# Patient Record
Sex: FEMALE | Race: WHITE | Employment: OTHER | URBAN - METROPOLITAN AREA
[De-identification: names, ages, dates, MRNs, and addresses within clinical notes are randomized per-mention and may not be internally consistent; named-entity substitution may affect disease eponyms.]

---

## 2018-11-27 ENCOUNTER — FOLLOW-UP (OUTPATIENT)
Dept: URBAN - METROPOLITAN AREA CLINIC 18 | Facility: CLINIC | Age: 73
Setting detail: DERMATOLOGY
End: 2018-11-27

## 2018-11-27 DIAGNOSIS — L57.0 ACTINIC KERATOSIS: ICD-10-CM

## 2018-11-27 PROCEDURE — OTHER LBN - COSMETIC PROCEDURE: OTHER

## 2020-06-25 ENCOUNTER — OTHER (OUTPATIENT)
Dept: URBAN - METROPOLITAN AREA CLINIC 18 | Facility: CLINIC | Age: 75
Setting detail: DERMATOLOGY
End: 2020-06-25

## 2020-06-25 DIAGNOSIS — L30.9 DERMATITIS, UNSPECIFIED: ICD-10-CM

## 2020-06-25 PROBLEM — L72.0 EPIDERMAL CYST: Status: RESOLVED | Noted: 2020-06-25

## 2020-06-25 PROBLEM — L90.5 SCAR CONDITIONS AND FIBROSIS OF SKIN: Status: RESOLVED | Noted: 2020-06-25

## 2020-06-25 PROCEDURE — 99213 OFFICE O/P EST LOW 20 MIN: CPT

## 2020-06-25 RX ORDER — METRONIDAZOLE 10 MG/G
AS DIRECTED GEL TOPICAL ONCE A DAY
Qty: 60 | Refills: 6
Start: 2020-06-25

## 2020-06-26 ENCOUNTER — RX ONLY (RX ONLY)
Age: 75
End: 2020-06-26

## 2020-06-26 RX ORDER — METRONIDAZOLE 10 MG/G
AS DIRECTED GEL TOPICAL ONCE A DAY
Qty: 60 | Refills: 6
Start: 2020-06-26

## 2021-02-12 ENCOUNTER — RX ONLY (RX ONLY)
Age: 76
End: 2021-02-12

## 2021-02-12 ENCOUNTER — SPOT (OUTPATIENT)
Dept: URBAN - METROPOLITAN AREA CLINIC 18 | Facility: CLINIC | Age: 76
Setting detail: DERMATOLOGY
End: 2021-02-12

## 2021-02-12 DIAGNOSIS — L82.0 INFLAMED SEBORRHEIC KERATOSIS: ICD-10-CM

## 2021-02-12 PROBLEM — D18.01 HEMANGIOMA OF SKIN AND SUBCUTANEOUS TISSUE: Status: RESOLVED | Noted: 2021-02-12

## 2021-02-12 PROBLEM — L82.1 OTHER SEBORRHEIC KERATOSIS: Status: RESOLVED | Noted: 2021-02-12

## 2021-02-12 PROBLEM — L57.0 ACTINIC KERATOSIS: Status: RESOLVED | Noted: 2021-02-12

## 2021-02-12 PROBLEM — L85.3 XEROSIS CUTIS: Status: RESOLVED | Noted: 2021-02-12

## 2021-02-12 PROCEDURE — 17003 DESTRUCT PREMALG LES 2-14: CPT

## 2021-02-12 PROCEDURE — 17000 DESTRUCT PREMALG LESION: CPT

## 2021-02-12 PROCEDURE — 99214 OFFICE O/P EST MOD 30 MIN: CPT

## 2021-02-12 RX ORDER — SULFACETAMIDE SODIUM 100 MG/ML
AS DIRECTED LOTION TOPICAL TWICE A DAY
Qty: 118 | Refills: 6
Start: 2021-02-12

## 2023-05-22 ENCOUNTER — OFFICE (OUTPATIENT)
Dept: URBAN - METROPOLITAN AREA CLINIC 72 | Facility: CLINIC | Age: 78
End: 2023-05-22
Payer: OTHER GOVERNMENT

## 2023-05-22 VITALS
HEART RATE: 84 BPM | RESPIRATION RATE: 14 BRPM | WEIGHT: 139 LBS | SYSTOLIC BLOOD PRESSURE: 126 MMHG | HEIGHT: 63 IN | DIASTOLIC BLOOD PRESSURE: 82 MMHG

## 2023-05-22 DIAGNOSIS — I48.91 UNSPECIFIED ATRIAL FIBRILLATION: ICD-10-CM

## 2023-05-22 DIAGNOSIS — K55.20 ANGIODYSPLASIA OF COLON WITHOUT HEMORRHAGE: ICD-10-CM

## 2023-05-22 DIAGNOSIS — D50.9 IRON DEFICIENCY ANEMIA, UNSPECIFIED: ICD-10-CM

## 2023-05-22 DIAGNOSIS — I05.9 RHEUMATIC MITRAL VALVE DISEASE, UNSPECIFIED: ICD-10-CM

## 2023-05-22 DIAGNOSIS — Z79.01 LONG TERM (CURRENT) USE OF ANTICOAGULANTS: ICD-10-CM

## 2023-05-22 DIAGNOSIS — Z86.010 PERSONAL HISTORY OF COLONIC POLYPS: ICD-10-CM

## 2023-05-22 PROCEDURE — 99214 OFFICE O/P EST MOD 30 MIN: CPT | Performed by: INTERNAL MEDICINE

## 2023-05-22 NOTE — SERVICEHPINOTES
Jami Spaulding   is seen today for a follow-up visit.     br  77 year old recently hospitilized 5/14-5/18/2023.  
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  SHe has also beeb on eliquis for a ib.  She presented with dark stool and a hgb of 6.  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  We recommended protonix 40.  biopsy of gastritis just showed some inactive chronic gastritis, no HP.  She was recommended to hold eliquis.  On discharge her Hgb was 10.  
br
Randy is feeling better.  She has more strength.  She is taking iron twice a day.  Stool is a bit darker on the iron but not black like it was.  She feels it is making her a little constipated.  She is taking pantoprazole 40 mg once aday before eating. She has not started back on her epiquis.  she normally takes 5 mg twice a day.  [facility]sey nurse has reviewed and updated the medication list with the patient (medication reconciliation). I have also reviewed the medication list. New updates were made to the patient's medical, social and family history. Pertinent details are also noted above in the HPI.br visited="true"

## 2023-06-02 LAB
CBC WITH DIFFERENTIAL/PLATELET: BASO (ABSOLUTE): 0.1 X10E3/UL (ref 0–0.2)
CBC WITH DIFFERENTIAL/PLATELET: BASOS: 1 %
CBC WITH DIFFERENTIAL/PLATELET: EOS (ABSOLUTE): 0.2 X10E3/UL (ref 0–0.4)
CBC WITH DIFFERENTIAL/PLATELET: EOS: 3 %
CBC WITH DIFFERENTIAL/PLATELET: HEMATOCRIT: 37 % (ref 34–46.6)
CBC WITH DIFFERENTIAL/PLATELET: HEMATOLOGY COMMENTS: (no result)
CBC WITH DIFFERENTIAL/PLATELET: HEMOGLOBIN: 11.7 G/DL (ref 11.1–15.9)
CBC WITH DIFFERENTIAL/PLATELET: IMMATURE CELLS: (no result)
CBC WITH DIFFERENTIAL/PLATELET: IMMATURE GRANS (ABS): 0 X10E3/UL (ref 0–0.1)
CBC WITH DIFFERENTIAL/PLATELET: IMMATURE GRANULOCYTES: 0 %
CBC WITH DIFFERENTIAL/PLATELET: LYMPHS (ABSOLUTE): 1.6 X10E3/UL (ref 0.7–3.1)
CBC WITH DIFFERENTIAL/PLATELET: LYMPHS: 27 %
CBC WITH DIFFERENTIAL/PLATELET: MCH: 29.8 PG (ref 26.6–33)
CBC WITH DIFFERENTIAL/PLATELET: MCHC: 31.6 G/DL (ref 31.5–35.7)
CBC WITH DIFFERENTIAL/PLATELET: MCV: 94 FL (ref 79–97)
CBC WITH DIFFERENTIAL/PLATELET: MONOCYTES(ABSOLUTE): 0.5 X10E3/UL (ref 0.1–0.9)
CBC WITH DIFFERENTIAL/PLATELET: MONOCYTES: 8 %
CBC WITH DIFFERENTIAL/PLATELET: NEUTROPHILS (ABSOLUTE): 3.6 X10E3/UL (ref 1.4–7)
CBC WITH DIFFERENTIAL/PLATELET: NEUTROPHILS: 61 %
CBC WITH DIFFERENTIAL/PLATELET: NRBC: (no result)
CBC WITH DIFFERENTIAL/PLATELET: PLATELETS: 283 X10E3/UL (ref 150–450)
CBC WITH DIFFERENTIAL/PLATELET: RBC: 3.93 X10E6/UL (ref 3.77–5.28)
CBC WITH DIFFERENTIAL/PLATELET: RDW: 16.5 % — HIGH (ref 11.7–15.4)
CBC WITH DIFFERENTIAL/PLATELET: WBC: 6 X10E3/UL (ref 3.4–10.8)

## 2023-06-22 ENCOUNTER — OFFICE (OUTPATIENT)
Dept: URBAN - METROPOLITAN AREA CLINIC 72 | Facility: CLINIC | Age: 78
End: 2023-06-22
Payer: MEDICARE

## 2023-06-22 VITALS
DIASTOLIC BLOOD PRESSURE: 72 MMHG | HEIGHT: 63 IN | OXYGEN SATURATION: 95 % | WEIGHT: 134 LBS | HEART RATE: 85 BPM | SYSTOLIC BLOOD PRESSURE: 118 MMHG

## 2023-06-22 DIAGNOSIS — Z86.010 PERSONAL HISTORY OF COLONIC POLYPS: ICD-10-CM

## 2023-06-22 DIAGNOSIS — K55.20 ANGIODYSPLASIA OF COLON WITHOUT HEMORRHAGE: ICD-10-CM

## 2023-06-22 DIAGNOSIS — I05.9 RHEUMATIC MITRAL VALVE DISEASE, UNSPECIFIED: ICD-10-CM

## 2023-06-22 DIAGNOSIS — D50.9 IRON DEFICIENCY ANEMIA, UNSPECIFIED: ICD-10-CM

## 2023-06-22 DIAGNOSIS — Z79.01 LONG TERM (CURRENT) USE OF ANTICOAGULANTS: ICD-10-CM

## 2023-06-22 DIAGNOSIS — I48.91 UNSPECIFIED ATRIAL FIBRILLATION: ICD-10-CM

## 2023-06-22 PROCEDURE — 99214 OFFICE O/P EST MOD 30 MIN: CPT | Performed by: INTERNAL MEDICINE

## 2023-06-22 NOTE — SERVICEHPINOTES
Jami Spaulding   is seen today for a follow-up visit.  
br
br77 year old recently hospitilized 5/14-5/18/2023. brShe was previously seen for endocarditis and diskitis and has been on antibiotics. SHe has also beeb on eliquis for a ib. She presented with dark stool and a hgb of 6. 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 We recommended protonix 40. biopsy of gastritis just showed some inactive chronic gastritis, no HP. She was recommended to hold eliquis. On discharge her Hgb was 10. She is feeling better. She has more strength. She is taking iron twice a day. Stool is a bit darker on the iron but not black like it was. She feels it is making her a little constipated. She is taking pantoprazole 40 mg once aday before eating. She has not started back on her epiquis. she normally takes 5 mg twice a day. br    br  Plan from last visit:Interval History:  6/22/2023   
br   Blood counts were looking good but liver tests were very abnormal 
br No pain in abdomen
br she just finished IV antibiotics, due to start oral antibiotics.  
br Her last dose of the IV was last thursday (1 week). 
br she is taking iron 1 pill twice a dayMy nurse has reviewed and updated the medication list with the patient (medication reconciliation). I have also reviewed the medication list. New updates were made to the patient's medical, social and family history. Pertinent details are also noted above in the HPI.br visited="true"

## 2023-06-22 NOTE — SERVICENOTES
Our goal is to partner with you to improve your health and well being. It is important for you to complete necessary testing and follow the instructions given to you at your clinic visit. Our office will call you within 2 weeks with results of any testing but you may also call sooner to obtain results [phone].   If you have any questions or concerns please feel free to call.  We take your care very seriously and we thank you for your trust!
- labs today
- If liver tests not improved ILl get ultrasound
- follow up in 1 month
- continue iron twice a day and pantoprazole 40

## 2023-07-25 ENCOUNTER — OFFICE (OUTPATIENT)
Dept: URBAN - METROPOLITAN AREA CLINIC 72 | Facility: CLINIC | Age: 78
End: 2023-07-25
Payer: OTHER GOVERNMENT

## 2023-07-25 VITALS
HEIGHT: 63 IN | WEIGHT: 130 LBS | HEART RATE: 74 BPM | DIASTOLIC BLOOD PRESSURE: 61 MMHG | SYSTOLIC BLOOD PRESSURE: 108 MMHG | OXYGEN SATURATION: 98 %

## 2023-07-25 DIAGNOSIS — I05.9 RHEUMATIC MITRAL VALVE DISEASE, UNSPECIFIED: ICD-10-CM

## 2023-07-25 DIAGNOSIS — K55.20 ANGIODYSPLASIA OF COLON WITHOUT HEMORRHAGE: ICD-10-CM

## 2023-07-25 DIAGNOSIS — B36.9 SUPERFICIAL MYCOSIS, UNSPECIFIED: ICD-10-CM

## 2023-07-25 DIAGNOSIS — Z86.010 PERSONAL HISTORY OF COLONIC POLYPS: ICD-10-CM

## 2023-07-25 DIAGNOSIS — Z79.01 LONG TERM (CURRENT) USE OF ANTICOAGULANTS: ICD-10-CM

## 2023-07-25 DIAGNOSIS — D50.9 IRON DEFICIENCY ANEMIA, UNSPECIFIED: ICD-10-CM

## 2023-07-25 DIAGNOSIS — I48.91 UNSPECIFIED ATRIAL FIBRILLATION: ICD-10-CM

## 2023-07-25 PROCEDURE — 99214 OFFICE O/P EST MOD 30 MIN: CPT | Performed by: INTERNAL MEDICINE

## 2023-07-25 RX ORDER — PANTOPRAZOLE 20 MG/1
TABLET, DELAYED RELEASE ORAL
Qty: 90 | Refills: 1 | Status: ACTIVE

## 2023-07-25 NOTE — SERVICENOTES
Our goal is to partner with you to improve your health and well being. It is important for you to complete necessary testing and follow the instructions given to you at your clinic visit. Our office will call you within 2 weeks with results of any testing but you may also call sooner to obtain results [phone].   If you have any questions or concerns please feel free to call.  We take your care very seriously and we thank you for your trust!
- schedule EGD and colonoscopy at [facility]. 
- you will need to be off eliquis for 48 hours prior to the colonoscopy and upper endoscopy.  DO not stop it more then 48 hours prior.  We will get cardiac clearance as well. 
- continue iron twice a day.  Stop the iron for 1 week prior to your colonoscopy. 
- stop pantoprazole 40 mg, start pantoprazole 20 mg once a day
- wash with gentle cleanser, blot dry and air dry or use a hairdryer on cool then apply hydrocortison cream to the irrirated areas for 3-5 days.  After 3-5 days continue to keep the area very dry and after drying it use antifungal powder (like lotrimin powder) until the rash clears up.  If it is not getting better please let your pcp know
- follow up in early september (after your procedures)

## 2023-07-25 NOTE — SERVICEHPINOTES
Jami Spaulding   is seen today for a follow-up visit.  
br
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 SHe has also been on eliquis for a ib. She presented with dark stool and a hgb of 6. 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 We recommended protonix 40. biopsy of gastritis just showed some inactive chronic gastritis, no HP. She was recommended to hold eliquis. On discharge her Hgb was 10.She is feeling better. She has more strength. She is taking iron twice a day. Stool is a bit darker on the iron but not black like it was. She feels it is making her a little constipated. She is taking pantoprazole 40 mg once aday before eating. She has not started back on her epiquis. she normally takes 5 mg twice a day.Plan from last visit:Interval History:6/22/2023brBlood counts were looking good but liver tests were very abnormalbrNo pain in abdomenbrshe just finished IV antibiotics, due to start oral antibiotics. brHer last dose of the IV was last thursday (1 week).randy is taking iron 1 pill twice a daybr    br  Plan from last visit:
br- labs todaybr- If liver tests not improved ILl get ultrasoundbr- follow up in 1 monthbr- continue iron twice a day and pantoprazole 40 Interval History:  7/25/2023   
br   She has been off antibiotics for about a month. 
br Her taste has not returned.  
br No blood in the stool no black stool. 
br She is taking pantoprazole 40 mg and iron 1 pill twice a day.  No side effects with the iron. 
br In June they saw cardiology and is not supposed to go back till january 
brHer heart rate has been fluctuating and sometimes is fast. She has some valve issues. 
br She is supposed to be on eliquis.  She is having trouble affording it.  she is taking it.
br NO shortness of breath with activity, she is working in the garden.  
br she has a rash under the breast. used some lotrimin creme and that made it itch moreMy nurse has reviewed and updated the medication list with the patient (medication reconciliation). I have also reviewed the medication list. New updates were made to the patient's medical, social and family history. Pertinent details are also noted above in the HPI.br visited="true"

## 2023-07-28 LAB
CBC WITH DIFFERENTIAL/PLATELET: BASO (ABSOLUTE): 0.1 X10E3/UL (ref 0–0.2)
CBC WITH DIFFERENTIAL/PLATELET: BASOS: 2 %
CBC WITH DIFFERENTIAL/PLATELET: EOS (ABSOLUTE): 0.1 X10E3/UL (ref 0–0.4)
CBC WITH DIFFERENTIAL/PLATELET: EOS: 2 %
CBC WITH DIFFERENTIAL/PLATELET: HEMATOCRIT: 40.6 % (ref 34–46.6)
CBC WITH DIFFERENTIAL/PLATELET: HEMATOLOGY COMMENTS: (no result)
CBC WITH DIFFERENTIAL/PLATELET: HEMOGLOBIN: 13.1 G/DL (ref 11.1–15.9)
CBC WITH DIFFERENTIAL/PLATELET: IMMATURE CELLS: (no result)
CBC WITH DIFFERENTIAL/PLATELET: IMMATURE GRANS (ABS): 0 X10E3/UL (ref 0–0.1)
CBC WITH DIFFERENTIAL/PLATELET: IMMATURE GRANULOCYTES: 0 %
CBC WITH DIFFERENTIAL/PLATELET: LYMPHS (ABSOLUTE): 2 X10E3/UL (ref 0.7–3.1)
CBC WITH DIFFERENTIAL/PLATELET: LYMPHS: 34 %
CBC WITH DIFFERENTIAL/PLATELET: MCH: 30 PG (ref 26.6–33)
CBC WITH DIFFERENTIAL/PLATELET: MCHC: 32.3 G/DL (ref 31.5–35.7)
CBC WITH DIFFERENTIAL/PLATELET: MCV: 93 FL (ref 79–97)
CBC WITH DIFFERENTIAL/PLATELET: MONOCYTES(ABSOLUTE): 0.7 X10E3/UL (ref 0.1–0.9)
CBC WITH DIFFERENTIAL/PLATELET: MONOCYTES: 12 %
CBC WITH DIFFERENTIAL/PLATELET: NEUTROPHILS (ABSOLUTE): 2.9 X10E3/UL (ref 1.4–7)
CBC WITH DIFFERENTIAL/PLATELET: NEUTROPHILS: 50 %
CBC WITH DIFFERENTIAL/PLATELET: NRBC: (no result)
CBC WITH DIFFERENTIAL/PLATELET: PLATELETS: 223 X10E3/UL (ref 150–450)
CBC WITH DIFFERENTIAL/PLATELET: RBC: 4.37 X10E6/UL (ref 3.77–5.28)
CBC WITH DIFFERENTIAL/PLATELET: RDW: 13.5 % (ref 11.7–15.4)
CBC WITH DIFFERENTIAL/PLATELET: WBC: 5.7 X10E3/UL (ref 3.4–10.8)
COMP. METABOLIC PANEL (14): A/G RATIO: 2.3 — HIGH (ref 1.2–2.2)
COMP. METABOLIC PANEL (14): ALBUMIN: 4.4 G/DL (ref 3.8–4.8)
COMP. METABOLIC PANEL (14): ALKALINE PHOSPHATASE: 79 IU/L (ref 44–121)
COMP. METABOLIC PANEL (14): ALT (SGPT): 16 IU/L (ref 0–32)
COMP. METABOLIC PANEL (14): AST (SGOT): 31 IU/L (ref 0–40)
COMP. METABOLIC PANEL (14): BILIRUBIN, TOTAL: 0.3 MG/DL (ref 0–1.2)
COMP. METABOLIC PANEL (14): BUN/CREATININE RATIO: 10 — LOW (ref 12–28)
COMP. METABOLIC PANEL (14): BUN: 17 MG/DL (ref 8–27)
COMP. METABOLIC PANEL (14): CALCIUM: 9.4 MG/DL (ref 8.7–10.3)
COMP. METABOLIC PANEL (14): CARBON DIOXIDE, TOTAL: 16 MMOL/L — LOW (ref 20–29)
COMP. METABOLIC PANEL (14): CHLORIDE: 108 MMOL/L — HIGH (ref 96–106)
COMP. METABOLIC PANEL (14): CREATININE: 1.71 MG/DL — HIGH (ref 0.57–1)
COMP. METABOLIC PANEL (14): EGFR: 31 ML/MIN/1.73 — LOW (ref 59–?)
COMP. METABOLIC PANEL (14): GLOBULIN, TOTAL: 1.9 G/DL (ref 1.5–4.5)
COMP. METABOLIC PANEL (14): GLUCOSE: 107 MG/DL — HIGH (ref 70–99)
COMP. METABOLIC PANEL (14): POTASSIUM: 5.1 MMOL/L (ref 3.5–5.2)
COMP. METABOLIC PANEL (14): PROTEIN, TOTAL: 6.3 G/DL (ref 6–8.5)
COMP. METABOLIC PANEL (14): SODIUM: 141 MMOL/L (ref 134–144)
FERRITIN: 60 NG/ML (ref 15–150)
IRON AND TIBC: IRON BIND.CAP.(TIBC): 352 UG/DL (ref 250–450)
IRON AND TIBC: IRON SATURATION: 55 % (ref 15–55)
IRON AND TIBC: IRON: 192 UG/DL — HIGH (ref 27–139)
IRON AND TIBC: UIBC: 160 UG/DL (ref 118–369)

## 2023-11-07 ENCOUNTER — ON CAMPUS - OUTPATIENT (OUTPATIENT)
Dept: URBAN - METROPOLITAN AREA HOSPITAL 79 | Facility: HOSPITAL | Age: 78
End: 2023-11-07
Payer: OTHER GOVERNMENT

## 2023-11-07 DIAGNOSIS — K63.5 POLYP OF COLON: ICD-10-CM

## 2023-11-07 DIAGNOSIS — D50.9 IRON DEFICIENCY ANEMIA, UNSPECIFIED: ICD-10-CM

## 2023-11-07 DIAGNOSIS — K63.89 OTHER SPECIFIED DISEASES OF INTESTINE: ICD-10-CM

## 2023-11-07 PROCEDURE — 45380 COLONOSCOPY AND BIOPSY: CPT | Mod: 59 | Performed by: INTERNAL MEDICINE

## 2023-11-07 PROCEDURE — 45385 COLONOSCOPY W/LESION REMOVAL: CPT | Performed by: INTERNAL MEDICINE

## 2023-11-07 PROCEDURE — 43235 EGD DIAGNOSTIC BRUSH WASH: CPT | Mod: 51 | Performed by: INTERNAL MEDICINE

## 2023-12-18 ENCOUNTER — OFFICE (OUTPATIENT)
Dept: URBAN - METROPOLITAN AREA CLINIC 72 | Facility: CLINIC | Age: 78
End: 2023-12-18
Payer: OTHER GOVERNMENT

## 2023-12-18 VITALS
OXYGEN SATURATION: 97 % | DIASTOLIC BLOOD PRESSURE: 68 MMHG | HEIGHT: 63 IN | HEART RATE: 82 BPM | WEIGHT: 127 LBS | SYSTOLIC BLOOD PRESSURE: 105 MMHG

## 2023-12-18 DIAGNOSIS — Z79.01 LONG TERM (CURRENT) USE OF ANTICOAGULANTS: ICD-10-CM

## 2023-12-18 DIAGNOSIS — D50.9 IRON DEFICIENCY ANEMIA, UNSPECIFIED: ICD-10-CM

## 2023-12-18 DIAGNOSIS — K55.20 ANGIODYSPLASIA OF COLON WITHOUT HEMORRHAGE: ICD-10-CM

## 2023-12-18 DIAGNOSIS — D12.6 BENIGN NEOPLASM OF COLON, UNSPECIFIED: ICD-10-CM

## 2023-12-18 DIAGNOSIS — I48.91 UNSPECIFIED ATRIAL FIBRILLATION: ICD-10-CM

## 2023-12-18 PROCEDURE — 99214 OFFICE O/P EST MOD 30 MIN: CPT | Performed by: INTERNAL MEDICINE

## 2023-12-18 NOTE — SERVICENOTES
Our goal is to partner with you to improve your health and well being. It is important for you to complete necessary testing and follow the instructions given to you at your clinic visit. Our office will call you within 2 weeks with results of any testing but you may also call sooner to obtain results [phone].   If you have any questions or concerns please feel free to call.  We take your care very seriously and we thank you for your trust!
- schedule capsule endoscopy, you need to be off iron for at least 3 days prior to the capsule endoscopy
- schedule appointment with DR. Francois. 
- follow up in 2 months

## 2023-12-18 NOTE — SERVICEHPINOTES
Jami Spaulding   is seen today for a follow-up visit.  
br
br77 year old recently hospitilized 5/14-5/18/2023.Valentina was previously seen for endocarditis and diskitis and has been on antibiotics. SHe has also been on eliquis for a ib. She presented with dark stool and a hgb of 6. 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 We recommended protonix 40. biopsy of gastritis just showed some inactive chronic gastritis, no HP. She was recommended to hold eliquis. On discharge her Hgb was 10.She is feeling better. She has more strength. She is taking iron twice a day. Stool is a bit darker on the iron but not black like it was. She feels it is making her a little constipated. She is taking pantoprazole 40 mg once aday before eating. She has not started back on her epiquis. she normally takes 5 mg twice a day.Plan from last visit:Interval History:6/22/2023brBlood counts were looking good but liver tests were very abnormalbrNo pain in abdomenbrshe just finished IV antibiotics, due to start oral antibiotics.brHer last dose of the IV was last thursday (1 week).valentina is taking iron 1 pill twice a daybrInterval History:7/25/2023brSnicho has been off antibiotics for about a month.brHer taste has not returned. brNo blood in the stool no black stool.Valentina is taking pantoprazole 40 mg and iron 1 pill twice a day. No side effects with the iron.brIn June they saw cardiology and is not supposed to go back till january brHer heart rate has been fluctuating and sometimes is fast. She has some valve issues. Valentina is supposed to be on eliquis. She is having trouble affording it. she is taking it.brNO shortness of breath with activity, she is working in the garden. valentina has a rash under the breast. used some lotrimin creme and that made it itch more    br  Plan from last visit:
br- schedule EGD and colonoscopy at [facility]. br- you will need to be off eliquis for 48 hours prior to the colonoscopy and upper endoscopy. DO not stop it more then 48 hours prior. We will get cardiac clearance as well. br- continue iron twice a day. Stop the iron for 1 week prior to your colonoscopy. br- stop pantoprazole 40 mg, start pantoprazole 20 mg once a daybr- wash with gentle cleanser, blot dry and air dry or use a hairdryer on cool then apply hydrocortison cream to the irrirated areas for 3-5 days. After 3-5 days continue to keep the area very dry and after drying it use antifungal powder (like lotrimin powder) until the rash clears up. If it is not getting better please let your pcp knowbr- follow up in early september (after your procedures) Interval History:  12/18/2023  bregd with small duodenal AVM
br colon with SSA at AO (other "polyp" was a small lipoma) 
brShe has lost weight from last time she was here but now it has stabilized at 127. 
br For a while the food didn't taste right and she didn't eat.  Now she is eating normally and weight is stable.  
br
br She is on iron pills one pill twice a day.  Stool is dark on the iron but no blood.  SHe has had iron infusion as well.  
br Her weight now is her normal weight.  She had gained a lot of weight after her back surgery and now she is back to her normal weight.  She feels like her appetite and taste are back as well. pashaMy nurse has reviewed and updated the medication list with the patient (medication reconciliation). I have also reviewed the medication list. New updates were made to the patient's medical, social and family history. Pertinent details are also noted above in the HPI.br visited="true"

## 2024-01-04 ENCOUNTER — OFFICE (OUTPATIENT)
Dept: URBAN - METROPOLITAN AREA CLINIC 72 | Facility: CLINIC | Age: 79
End: 2024-01-04
Payer: OTHER GOVERNMENT

## 2024-01-04 DIAGNOSIS — D50.9 IRON DEFICIENCY ANEMIA, UNSPECIFIED: ICD-10-CM

## 2024-01-04 PROCEDURE — 91110 GI TRC IMG INTRAL ESOPH-ILE: CPT | Performed by: SPECIALIST

## 2024-02-14 ENCOUNTER — OFFICE (OUTPATIENT)
Dept: URBAN - METROPOLITAN AREA CLINIC 72 | Facility: CLINIC | Age: 79
End: 2024-02-14
Payer: OTHER GOVERNMENT

## 2024-02-14 VITALS
SYSTOLIC BLOOD PRESSURE: 110 MMHG | WEIGHT: 126 LBS | RESPIRATION RATE: 14 BRPM | DIASTOLIC BLOOD PRESSURE: 60 MMHG | HEIGHT: 63 IN | HEART RATE: 78 BPM

## 2024-02-14 DIAGNOSIS — D50.9 IRON DEFICIENCY ANEMIA, UNSPECIFIED: ICD-10-CM

## 2024-02-14 DIAGNOSIS — D12.6 BENIGN NEOPLASM OF COLON, UNSPECIFIED: ICD-10-CM

## 2024-02-14 DIAGNOSIS — K55.20 ANGIODYSPLASIA OF COLON WITHOUT HEMORRHAGE: ICD-10-CM

## 2024-02-14 DIAGNOSIS — Z86.010 PERSONAL HISTORY OF COLONIC POLYPS: ICD-10-CM

## 2024-02-14 DIAGNOSIS — L30.9 DERMATITIS, UNSPECIFIED: ICD-10-CM

## 2024-02-14 PROCEDURE — 99214 OFFICE O/P EST MOD 30 MIN: CPT | Performed by: INTERNAL MEDICINE

## 2024-02-14 RX ORDER — CEPHALEXIN 250 MG/1
TABLET ORAL
Qty: 28 | Refills: 0 | Status: ACTIVE
Start: 2024-02-14

## 2024-02-14 NOTE — SERVICENOTES
Our goal is to partner with you to improve your health and well being. It is important for you to complete necessary testing and follow the instructions given to you at your clinic visit. Our office will call you within 2 weeks with results of any testing but you may also call sooner to obtain results [phone].   If you have any questions or concerns please feel free to call.  We take your care very seriously and we thank you for your trust!
- Dr. Ernesto Francois [phone].  They should contact you to schedule an appointment but if you don't hear from them please let us know.
- try to wear cotton only underwear (wear two pairs if needed.  Avoid depends if possible because they don't allow the area to stay dry
- continue to use the zinc barrier ointment. 
- take cephalexin 1 pill 4 times a day for 7 days
- stop iron pills.  labs today and you will need to make a follow up with Dr. Meadows to let him know that we had to stop the oral iron because it may have been contributing to the dermatitis
- [facility], if rash doesn't clear up then please see them

## 2024-02-14 NOTE — SERVICEHPINOTES
Jami Spaulding   is seen today for a follow-up visit.  
br
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 SHe has also been on eliquis for a ib. She presented with dark stool and a hgb of 6. 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 We recommended protonix 40. biopsy of gastritis just showed some inactive chronic gastritis, no HP. She was recommended to hold eliquis. On discharge her Hgb was 10.She is feeling better. She has more strength. She is taking iron twice a day. Stool is a bit darker on the iron but not black like it was. She feels it is making her a little constipated. She is taking pantoprazole 40 mg once aday before eating. She has not started back on her epiquis. she normally takes 5 mg twice a day.Plan from last visit:Interval History:6/22/2023brBlood counts were looking good but liver tests were very abnormalbrNo pain in abdomenbrshe just finished IV antibiotics, due to start oral antibiotics.brHer last dose of the IV was last thursday (1 week).randy is taking iron 1 pill twice a daybrInterval History:7/25/2023brSnicho has been off antibiotics for about a month.brHer taste has not returned.brNo blood in the stool no black stool.Radny is taking pantoprazole 40 mg and iron 1 pill twice a day. No side effects with the iron.brIn June they saw cardiology and is not supposed to go back till januarybrHer heart rate has been fluctuating and sometimes is fast. She has some valve issues.Randy is supposed to be on eliquis. She is having trouble affording it. she is taking it.brNO shortness of breath with activity, she is working in the garden.randy has a rash under the breast. used some lotrimin creme and that made it itch morebrPlan from last visit:br- schedule EGD and colonoscopy at [facility].br- you will need to be off eliquis for 48 hours prior to the colonoscopy and upper endoscopy. DO not stop it more then 48 hours prior. We will get cardiac clearance as well.br- continue iron twice a day. Stop the iron for 1 week prior to your colonoscopy.br- stop pantoprazole 40 mg, start pantoprazole 20 mg once a daybr- wash with gentle cleanser, blot dry and air dry or use a hairdryer on cool then apply hydrocortison cream to the irrirated areas for 3-5 days. After 3-5 days continue to keep the area very dry and after drying it use antifungal powder (like lotrimin powder) until the rash clears up. If it is not getting better please let your pcp knowbr- follow up in early september (after your procedures)Interval History:12/18/2023bregd with small duodenal AVMbrcolon with SSA at AO (other "polyp" was a small lipoma) brShe has lost weight from last time she was here but now it has stabilized at 127. brFor a while the food didn't taste right and she didn't eat. Now she is eating normally and weight is stable. She is on iron pills one pill twice a day. Stool is dark on the iron but no blood. SHe has had iron infusion as well. brHer weight now is her normal weight. She had gained a lot of weight after her back surgery and now she is back to her normal weight. She feels like her appetite and taste are back as well.     br  Plan from last visit:
br
br- schedule capsule endoscopy, you need to be off iron for at least 3 days prior to the capsule endoscopybr- schedule appointment with DR. Francois. br- follow up in 2 months Interval History:  2/14/2024  
br   she has not seen Dr. Francois because he didn't call. 
br
br her main issues today is brunign, irritation in the anal area. ot keeps getting worse.  br They have been trying desitin, antifungal and cortisone and it is not getting better.  she is doing anal care.  No fecal soilage.  She wonders if it correlates with time that she started iron pills.  My nurse has reviewed and updated the medication list with the patient (medication reconciliation). I have also reviewed the medication list. New updates were made to the patient's medical, social and family history. Pertinent details are also noted above in the HPI.br visited="true"